# Patient Record
Sex: MALE | Race: WHITE | Employment: OTHER | URBAN - METROPOLITAN AREA
[De-identification: names, ages, dates, MRNs, and addresses within clinical notes are randomized per-mention and may not be internally consistent; named-entity substitution may affect disease eponyms.]

---

## 2021-01-21 ENCOUNTER — TRANSFERRED RECORDS (OUTPATIENT)
Dept: HEALTH INFORMATION MANAGEMENT | Facility: CLINIC | Age: 71
End: 2021-01-21

## 2021-01-22 ENCOUNTER — TRANSFERRED RECORDS (OUTPATIENT)
Dept: HEALTH INFORMATION MANAGEMENT | Facility: CLINIC | Age: 71
End: 2021-01-22

## 2021-03-22 ENCOUNTER — TRANSCRIBE ORDERS (OUTPATIENT)
Dept: OTHER | Age: 71
End: 2021-03-22

## 2021-03-22 ENCOUNTER — DOCUMENTATION ONLY (OUTPATIENT)
Dept: ONCOLOGY | Facility: CLINIC | Age: 71
End: 2021-03-22

## 2021-03-22 DIAGNOSIS — K86.2 CYST OF PANCREAS: Primary | ICD-10-CM

## 2021-03-22 NOTE — PROGRESS NOTES
Action    Action Taken 3/22/21:    -Spoke w/ Ce @ [facility] - she will push imaging shortly.    -Spoke w/ [facility] - they will push over imaging (CT & US) and fa reports shortly.  12:26 PM    -Radiology Reports received from [facility], sent to HIM for upload  12:39 PM       3/24/21:     Imaging resolved to PACS  10:53 AM       RECORDS STATUS - ALL OTHER DIAGNOSIS      RECORDS RECEIVED FROM: [facility]   DATE RECEIVED: 3/24   NOTES STATUS DETAILS   OFFICE NOTE from referring provider SANJUANA El: 3/10/21   OFFICE NOTE from medical oncologist     DISCHARGE SUMMARY from hospital Requested 3/22 [facility]   DISCHARGE REPORT from the ER     OPERATIVE REPORT [facility] 2/26/21: EUS   MEDICATION LIST     CLINICAL TRIAL TREATMENTS TO DATE     LABS     PATHOLOGY REPORTS [facility]/JUDE Gonzalez, Reports in [facility] 2/26/21: [id number]   ANYTHING RELATED TO DIAGNOSIS     GENONOMIC TESTING     TYPE:     IMAGING (NEED IMAGES & REPORT)     CT SCANS PACS 1/22/21: [facility] (Image & report requested)   MRI PACS 3/18/21: [facility]   MAMMO     ULTRASOUND PACS 1/21/21: [facility] (Image & report requested)   PET

## 2021-03-26 ENCOUNTER — VIRTUAL VISIT (OUTPATIENT)
Dept: SURGERY | Facility: CLINIC | Age: 71
End: 2021-03-26
Attending: EMERGENCY MEDICINE
Payer: COMMERCIAL

## 2021-03-26 DIAGNOSIS — K86.2 CYST OF PANCREAS: ICD-10-CM

## 2021-03-26 DIAGNOSIS — K86.2 PANCREATIC CYST: Primary | ICD-10-CM

## 2021-03-26 PROCEDURE — 999N000109 HC STATISTIC OP CR VISIT

## 2021-03-26 PROCEDURE — 99204 OFFICE O/P NEW MOD 45 MIN: CPT | Mod: 95 | Performed by: SURGERY

## 2021-03-26 RX ORDER — LISINOPRIL 10 MG/1
10 TABLET ORAL
COMMUNITY

## 2021-03-26 RX ORDER — TRIAMCINOLONE ACETONIDE 1 MG/G
CREAM TOPICAL
COMMUNITY

## 2021-03-26 NOTE — PROGRESS NOTES
"Tristan is a 70 year old who is being evaluated via a billable video visit.      How would you like to obtain your AVS? MyChart  If the video visit is dropped, the invitation should be resent by: Text to cell phone: [phone]  Will anyone else be joining your video visit? No      I have reviewed and updated the patient's allergies and medication list.    Concerns: No new concerns.   Refills: None needed.        Vitals - Patient Reported  Weight (Patient Reported): 62.1 kg (137 lb)  Height (Patient Reported): 165.1 cm (5' 5\")  BMI (Based on Pt Reported Ht/Wt): 22.8  Pain Score: No Pain (0)     Yola Chacko CMA      Video Start Time: 3:52 PM    Video-Visit Details    Type of service:  Video Visit    Video End Time: 4:15 PM    Originating Location (pt. 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  No high risk features.  He was referred by Dr Coronado.    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  No high risk features.  He initially presented with abdominal bloating in January 2021 and underwent abdominal US showing the cyst in the pancreas.  Subsequent CT scan demonstrated the cyst in the uncinate of the pancreas, confirmed by later MRI/MRCP.  EUS was done, which did not show other worrisome features aside from a size of 4.7 cm.  Fluid aspiration was done, which showed low amylase and very elevated CEA > 15,000.  Given the likely diagnosis of a mucinous cyst, he has been sent for surgical opinion.    Pertinent Exam: Deferred given video visit.    HISTORY OF PRESENT ILLNESS:  He noted that he feels very well and would like to be aggressive " if there is pre-malignant potential.  He has had several family members with various different cancers, so he has heightened awareness.  Medical problems include well controlled HTN.  No prior abdominal operations.  He does not take blood thinning medications.  His functional status is independent and he is highly active, particularly with cross-country skiing in the winter, where he will commonly go 6 miles a day.    Pertinent Work-Up/Findings:    Labs: Cyst fluid amylase = 27.  Cyst fluid CEA > 15,000.    CTAP (1/21/2021): 4.7 cm, likely cystic neoplasm of the head/uncinate of the pancreas.    MRI/MRCP (3/18/2021): IMPRESSION: Multilobulated T2 bright cystic lesion within the head/uncinate process of the pancreas. Findings suspicious for cystic neoplasm in variable signal intensity raising the possibility of mucin. Recommend correlation with endoscopic ultrasound with consideration for surgical excision based on size (4.5 cm).    EUS (2/26/2021): Impression:  - A 4.7 cm x 3.5 cm cystic lesion was seen in the uncinate process of the pancreas suspicious for mucinious neoplasm. Fine needle aspiration for fluid performed.  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  - There was no sign of significant pathology in the main pancreatic duct.  - There was no sign of significant pathology in the common bile duct. Measures 4 mm.  - There was no sign of significant pathology in the gallbladder.  - There was no evidence of significant pathology in the visualized portion of the liver.  - Normal appearing left adrenal gland.  - No pathological lymphadenopathy.    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  No high risk features.  Cyst fluid analysis and imaging appearance most suggestive of MCN, although side branch IPMN could be possible.  I had  a discussion with the patient regarding mucinous neoplasms of the pancreas and their pre-malignant nature.  They can harbor or degenerate to a cancer.  Well published features definitely increase this risk and help us decide appropriate surgical candidates.  He does have the worrisome feature of a cyst more than 3 cm in size, but there are no other worrisome features or high risk features that suggest high likelihood of malignancy.  I will have our radiologists review his images to be sure they agree.  In addition, he likely has MCN, which we know much less about in terms of the natural history.  Malignancy risk is as high as 15% or more, and the general recommendation is to resect MCN in individuals that are fit for surgery and have reasonable life expectancy.  The goal with that is prevention of malignancy or treatment and staging of a malignancy if one is already present on final pathology.  With that being said, he is 70, there are no high risk features, and resection would require a Whipple.  This is not insignificant for him as the operation can be associated with significant morbidity and chance for decrease in the quality of his life.  This has to be taken carefully into context when thinking about the lesion we would treat as there is potential risk for over treatment in his case.  After careful discussion with the patient, given the likely pre-malignant nature of the cyst and his outstanding health for his age, he would like to pursue surgical resection.  We discussed risks of surgery including but not limited to death, bleeding, infection, pneumonia, COVID-19, renal failure, stroke, cardiac events, UTI, DVT/PE, damage to surrounding organs, ileus, bowel obstruction, delayed gastric emptying, anastomotic leak (biliary, pancreatic, enteric), chyle leak, and malnutrition with failure to thrive.  He may require further procedures/surgeries to deal with complications.  He may require prolonged drainage or  prolonged enteral/parenteral nutrition.  Long term, there can be risk of diabetes and pancreatic exocrine insufficiency.  Also biliary stricture, bowel obstruction, and incisional hernia.  He clearly understands the risks and benefits as well as the alternative of surveillance.  He would like to proceed with surgery.  We will set him up for a date, PACS visit, and COVID testing.  All questions were answered and the patient was in agreement with and understanding of the plan.    Total time spent with the patient was 45 minutes.  23 minutes in face to face time, 12 minutes in imaging and chart review, and 10 minutes of documentation and coordination of care.

## 2021-03-26 NOTE — LETTER
3/26/2021         RE: Christos Lakhani  [address]  [facility] [address]        Dear Colleague,    Thank you for referring your patient, Christos Lakhani, to the [facility]. Please see a copy of my visit note below.    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  No high risk features.  He was referred by Dr Coronado.    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  No high risk features.  He initially presented with abdominal bloating in January 2021 and underwent abdominal US showing the cyst in the pancreas.  Subsequent CT scan demonstrated the cyst in the uncinate of the pancreas, confirmed by later MRI/MRCP.  EUS was done, which did not show other worrisome features aside from a size of 4.7 cm.  Fluid aspiration was done, which showed low amylase and very elevated CEA > 15,000.  Given the likely diagnosis of a mucinous cyst, he has been sent for surgical opinion.    Pertinent Exam: Deferred given video visit.    HISTORY OF PRESENT ILLNESS:  He noted that he feels very well and would like to be aggressive if there is pre-malignant potential.  He has had several family members with various different cancers, so he has heightened awareness.  Medical problems include well controlled HTN.  No prior abdominal operations.  He does not take blood thinning medications.  His functional status is independent and he is highly active, particularly with cross-country skiing in the winter, where he will commonly go 6 miles a day.    Pertinent Work-Up/Findings:    Labs: Cyst fluid amylase = 27.  Cyst fluid CEA > 15,000.    CTAP (1/21/2021): 4.7 cm, likely cystic neoplasm of the head/uncinate of the pancreas.    MRI/MRCP (3/18/2021): IMPRESSION:  Multilobulated T2 bright cystic lesion within the head/uncinate process of the pancreas. Findings suspicious for cystic neoplasm in variable signal intensity raising the possibility of mucin. Recommend correlation with endoscopic ultrasound with consideration for surgical excision based on size (4.5 cm).    EUS (2/26/2021): Impression:  - A 4.7 cm x 3.5 cm cystic lesion was seen in the uncinate process of the pancreas suspicious for mucinious neoplasm. Fine needle aspiration for fluid performed.  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  - There was no sign of significant pathology in the main pancreatic duct.  - There was no sign of significant pathology in the common bile duct. Measures 4 mm.  - There was no sign of significant pathology in the gallbladder.  - There was no evidence of significant pathology in the visualized portion of the liver.  - Normal appearing left adrenal gland.  - No pathological lymphadenopathy.    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  No high risk features.  Cyst fluid analysis and imaging appearance most suggestive of MCN, although side branch IPMN could be possible.  I had a discussion with the patient regarding mucinous neoplasms of the pancreas and their pre-malignant nature.  They can harbor or degenerate to a cancer.  Well published features definitely increase this risk and help us decide appropriate surgical candidates.  He does have the worrisome feature of a cyst more than 3 cm in size, but there are no other worrisome features or high risk features that suggest high likelihood of malignancy.  I will have our radiologists review his images to be sure they agree.  In addition, he likely has MCN, which we know much less about in terms of the natural history.  Malignancy risk is as high as  15% or more, and the general recommendation is to resect MCN in individuals that are fit for surgery and have reasonable life expectancy.  The goal with that is prevention of malignancy or treatment and staging of a malignancy if one is already present on final pathology.  With that being said, he is 70, there are no high risk features, and resection would require a Whipple.  This is not insignificant for him as the operation can be associated with significant morbidity and chance for decrease in the quality of his life.  This has to be taken carefully into context when thinking about the lesion we would treat as there is potential risk for over treatment in his case.  After careful discussion with the patient, given the likely pre-malignant nature of the cyst and his outstanding health for his age, he would like to pursue surgical resection.  We discussed risks of surgery including but not limited to death, bleeding, infection, pneumonia, COVID-19, renal failure, stroke, cardiac events, UTI, DVT/PE, damage to surrounding organs, ileus, bowel obstruction, delayed gastric emptying, anastomotic leak (biliary, pancreatic, enteric), chyle leak, and malnutrition with failure to thrive.  He may require further procedures/surgeries to deal with complications.  He may require prolonged drainage or prolonged enteral/parenteral nutrition.  Long term, there can be risk of diabetes and pancreatic exocrine insufficiency.  Also biliary stricture, bowel obstruction, and incisional hernia.  He clearly understands the risks and benefits as well as the alternative of surveillance.  He would like to proceed with surgery.  We will set him up for a date, PACS visit, and COVID testing.  All questions were answered and the patient was in agreement with and understanding of the plan.    Total time spent with the patient was 45 minutes.  23 minutes in face to face time, 12 minutes in imaging and chart review, and 10 minutes of documentation  and coordination of care.      Again, thank you for allowing me to participate in the care of your patient.      Sincerely,    Vj Contreras MD

## 2021-03-29 ENCOUNTER — HOSPITAL ENCOUNTER (INPATIENT)
Dept: GENERAL RADIOLOGY | Facility: CLINIC | Age: 71
End: 2021-03-29
Attending: SURGERY
Payer: COMMERCIAL

## 2021-03-29 ENCOUNTER — PREP FOR PROCEDURE (OUTPATIENT)
Dept: SURGERY | Facility: CLINIC | Age: 71
End: 2021-03-29

## 2021-03-29 DIAGNOSIS — K86.2 PANCREATIC CYST: ICD-10-CM

## 2021-03-29 DIAGNOSIS — K86.2 CYST OF PANCREAS: ICD-10-CM

## 2021-03-29 DIAGNOSIS — K86.2 PANCREAS CYST: Primary | ICD-10-CM

## 2021-03-30 ENCOUNTER — PATIENT OUTREACH (OUTPATIENT)
Dept: SURGERY | Facility: CLINIC | Age: 71
End: 2021-03-30

## 2021-03-30 NOTE — PROGRESS NOTES
Surgical Oncology RN Care Coordination Note:     Called and informed patient that our  will be in touch to discuss scheduling surgery date and pre op appointment, informed him that she is out this week but that she will call him by early next week to finalize details. Informed him if he needs to confirm an OR date ASAP he can call me directly and we can discuss dates for surgery this week and finalized details when scheduling team returns.     Beatris Arias RN, BSN  Care Coordinator

## 2021-03-31 ENCOUNTER — PATIENT OUTREACH (OUTPATIENT)
Dept: SURGERY | Facility: CLINIC | Age: 71
End: 2021-03-31

## 2021-03-31 NOTE — PROGRESS NOTES
Surgical Oncology RN Care Coordination Note:     Patient called and left a message stating that he has decided to proceed with surgery at [place] and doesn't need any further follow up at the .    Updated surgery scheduling and will cancel orders.     Beatris Arias RN, BSN  Care Coordinator

## 2021-04-25 ENCOUNTER — HEALTH MAINTENANCE LETTER (OUTPATIENT)
Age: 71
End: 2021-04-25

## 2021-10-09 ENCOUNTER — HEALTH MAINTENANCE LETTER (OUTPATIENT)
Age: 71
End: 2021-10-09

## 2022-05-21 ENCOUNTER — HEALTH MAINTENANCE LETTER (OUTPATIENT)
Age: 72
End: 2022-05-21

## 2022-09-17 ENCOUNTER — HEALTH MAINTENANCE LETTER (OUTPATIENT)
Age: 72
End: 2022-09-17

## 2023-06-04 ENCOUNTER — HEALTH MAINTENANCE LETTER (OUTPATIENT)
Age: 73
End: 2023-06-04